# Patient Record
Sex: MALE | Race: WHITE
[De-identification: names, ages, dates, MRNs, and addresses within clinical notes are randomized per-mention and may not be internally consistent; named-entity substitution may affect disease eponyms.]

---

## 2017-09-19 ENCOUNTER — HOSPITAL ENCOUNTER (OUTPATIENT)
Dept: HOSPITAL 92 - ERS | Age: 42
Setting detail: OBSERVATION
LOS: 1 days | Discharge: HOME | End: 2017-09-20
Attending: HOSPITALIST | Admitting: HOSPITALIST
Payer: SELF-PAY

## 2017-09-19 VITALS — BODY MASS INDEX: 34 KG/M2

## 2017-09-19 DIAGNOSIS — E78.5: ICD-10-CM

## 2017-09-19 DIAGNOSIS — Z79.82: ICD-10-CM

## 2017-09-19 DIAGNOSIS — Z79.4: ICD-10-CM

## 2017-09-19 DIAGNOSIS — I12.9: ICD-10-CM

## 2017-09-19 DIAGNOSIS — Z87.891: ICD-10-CM

## 2017-09-19 DIAGNOSIS — E11.22: ICD-10-CM

## 2017-09-19 DIAGNOSIS — Z79.899: ICD-10-CM

## 2017-09-19 DIAGNOSIS — R07.89: Primary | ICD-10-CM

## 2017-09-19 DIAGNOSIS — N18.4: ICD-10-CM

## 2017-09-19 LAB
ALP SERPL-CCNC: 62 U/L (ref 40–150)
ALT SERPL W P-5'-P-CCNC: 16 U/L (ref 8–55)
ANION GAP SERPL CALC-SCNC: 13 MMOL/L (ref 10–20)
AST SERPL-CCNC: 14 U/L (ref 5–34)
BASOPHILS # BLD AUTO: 0.1 THOU/UL (ref 0–0.2)
BASOPHILS NFR BLD AUTO: 0.9 % (ref 0–1)
BILIRUB SERPL-MCNC: 0.3 MG/DL (ref 0.2–1.2)
BUN SERPL-MCNC: 32 MG/DL (ref 8.9–20.6)
CALCIUM SERPL-MCNC: 9 MG/DL (ref 7.8–10.44)
CHLORIDE SERPL-SCNC: 106 MMOL/L (ref 98–107)
CK SERPL-CCNC: 199 U/L (ref 30–200)
CO2 SERPL-SCNC: 22 MMOL/L (ref 22–29)
CREAT CL PREDICTED SERPL C-G-VRATE: 0 ML/MIN (ref 70–130)
EOSINOPHIL # BLD AUTO: 0.3 THOU/UL (ref 0–0.7)
EOSINOPHIL NFR BLD AUTO: 3.1 % (ref 0–10)
GLOBULIN SER CALC-MCNC: 2.9 G/DL (ref 2.4–3.5)
HCT VFR BLD CALC: 39.6 % (ref 42–52)
HYALINE CASTS #/AREA URNS LPF: (no result) LPF
LIPASE SERPL-CCNC: 25 U/L (ref 8–78)
LYMPHOCYTES # BLD: 2.5 THOU/UL (ref 1.2–3.4)
LYMPHOCYTES NFR BLD AUTO: 23.8 % (ref 21–51)
MONOCYTES # BLD AUTO: 1 THOU/UL (ref 0.11–0.59)
MONOCYTES NFR BLD AUTO: 9.5 % (ref 0–10)
NEUTROPHILS # BLD AUTO: 6.5 THOU/UL (ref 1.4–6.5)
PROT UR STRIP.AUTO-MCNC: (no result) MG/DL
RBC # BLD AUTO: 4.3 MILL/UL (ref 4.7–6.1)
RBC UR QL AUTO: (no result) HPF (ref 0–3)
TROPONIN I SERPL DL<=0.01 NG/ML-MCNC: (no result) NG/ML (ref ?–0.03)
TROPONIN I SERPL DL<=0.01 NG/ML-MCNC: 0.01 NG/ML (ref ?–0.03)
WBC # BLD AUTO: 10.4 THOU/UL (ref 4.8–10.8)
WBC UR QL AUTO: (no result) HPF (ref 0–3)

## 2017-09-19 PROCEDURE — 71010: CPT

## 2017-09-19 PROCEDURE — 81015 MICROSCOPIC EXAM OF URINE: CPT

## 2017-09-19 PROCEDURE — 85025 COMPLETE CBC W/AUTO DIFF WBC: CPT

## 2017-09-19 PROCEDURE — G0378 HOSPITAL OBSERVATION PER HR: HCPCS

## 2017-09-19 PROCEDURE — A4216 STERILE WATER/SALINE, 10 ML: HCPCS

## 2017-09-19 PROCEDURE — 83690 ASSAY OF LIPASE: CPT

## 2017-09-19 PROCEDURE — 93005 ELECTROCARDIOGRAM TRACING: CPT

## 2017-09-19 PROCEDURE — 81003 URINALYSIS AUTO W/O SCOPE: CPT

## 2017-09-19 PROCEDURE — 80053 COMPREHEN METABOLIC PANEL: CPT

## 2017-09-19 PROCEDURE — 80048 BASIC METABOLIC PNL TOTAL CA: CPT

## 2017-09-19 PROCEDURE — 82553 CREATINE MB FRACTION: CPT

## 2017-09-19 PROCEDURE — 84484 ASSAY OF TROPONIN QUANT: CPT

## 2017-09-19 PROCEDURE — 94760 N-INVAS EAR/PLS OXIMETRY 1: CPT

## 2017-09-19 PROCEDURE — 36415 COLL VENOUS BLD VENIPUNCTURE: CPT

## 2017-09-19 NOTE — RAD
SINGLE VIEW OF THE CHEST:

9/19/17

 

COMPARISON:  

6/21/17

 

HISTORY: 

Chest pain. 

 

FINDINGS:

Single view of the chest shows a normal sized cardiomediastinal silhouette. There is no evidence of 
consolidation, mass, or pleural effusion. The bones are unremarkable.

 

IMPRESSION:

No evidence of acute cardiopulmonary disease.

 

POS: [facility]

## 2017-09-19 NOTE — PDOC.EVN
Event Note





- Event Note


Event Note: 





[id number]


H&P Dictated


1. Chest pain


2. H/O HTN


3. H/O HPL


4. H/O DM type 2


plan:


see orders

## 2017-09-20 VITALS — DIASTOLIC BLOOD PRESSURE: 92 MMHG | SYSTOLIC BLOOD PRESSURE: 164 MMHG | TEMPERATURE: 97.4 F

## 2017-09-20 LAB
ANION GAP SERPL CALC-SCNC: 14 MMOL/L (ref 10–20)
BASOPHILS # BLD AUTO: 0.1 THOU/UL (ref 0–0.2)
BASOPHILS NFR BLD AUTO: 1 % (ref 0–1)
BUN SERPL-MCNC: 34 MG/DL (ref 8.9–20.6)
CALCIUM SERPL-MCNC: 9.4 MG/DL (ref 7.8–10.44)
CHLORIDE SERPL-SCNC: 103 MMOL/L (ref 98–107)
CO2 SERPL-SCNC: 25 MMOL/L (ref 22–29)
CREAT CL PREDICTED SERPL C-G-VRATE: 82 ML/MIN (ref 70–130)
EOSINOPHIL # BLD AUTO: 0.4 THOU/UL (ref 0–0.7)
EOSINOPHIL NFR BLD AUTO: 4.5 % (ref 0–10)
HCT VFR BLD CALC: 35.4 % (ref 42–52)
LYMPHOCYTES # BLD: 2.5 THOU/UL (ref 1.2–3.4)
LYMPHOCYTES NFR BLD AUTO: 28.8 % (ref 21–51)
MONOCYTES # BLD AUTO: 0.8 THOU/UL (ref 0.11–0.59)
MONOCYTES NFR BLD AUTO: 9.4 % (ref 0–10)
NEUTROPHILS # BLD AUTO: 4.8 THOU/UL (ref 1.4–6.5)
RBC # BLD AUTO: 3.84 MILL/UL (ref 4.7–6.1)
TROPONIN I SERPL DL<=0.01 NG/ML-MCNC: (no result) NG/ML (ref ?–0.03)
TROPONIN I SERPL DL<=0.01 NG/ML-MCNC: (no result) NG/ML (ref ?–0.03)
WBC # BLD AUTO: 8.6 THOU/UL (ref 4.8–10.8)

## 2017-09-20 NOTE — PDOC.PN
- Subjective


Encounter Start Date: 09/20/17


Encounter Start Time: 07:39


Subjective: Chest pain resolved


-: Pt requesting discharge home today





- Objective


MAR Reviewed: Yes


Vital Signs & Weight: 


 Vital Signs (12 hours)











  Temp Pulse Resp BP Pulse Ox


 


 09/20/17 04:45  97.6 F  73  18  142/86 H  98


 


 09/19/17 22:47  97.9 F  72  16  142/93 H  96








 Weight











Weight                         250 lb 8 oz














I&O: 


 











 09/19/17 09/20/17 09/21/17





 06:59 06:59 06:59


 


Intake Total  480 


 


Balance  480 











Result Diagrams: 


 09/20/17 03:50





 09/20/17 03:50





Phys Exam





- Physical Examination


Constitutional: NAD


HEENT: moist MMs, sclera anicteric


Neck: no nodes, no JVD


Respiratory: no wheezing, no rales, no rhonchi, clear to auscultation bilateral


Cardiovascular: RRR, no significant murmur


Gastrointestinal: soft, non-tender, positive bowel sounds


Musculoskeletal: no edema


Neurological: non-focal, moves all 4 limbs





Dx/Plan


(1) Type 2 diabetes mellitus


Status: Acute   





(2) Chest pain


Code(s): R07.9 - CHEST PAIN, UNSPECIFIED   Status: Acute   





(3) Dyslipidemia


Code(s): E78.5 - HYPERLIPIDEMIA, UNSPECIFIED   Status: Acute   





(4) Hypertensive urgency


Code(s): I16.0 - HYPERTENSIVE URGENCY   Status: Acute   





- Plan





* Chest pain: serial troponins negative; stress test from 6-22-17 negative


* CKD (Cr baseline ~ 1.6-1.9): avoid nephrotoxic agents


* DM2: f/u accu checks


* HTN: increase clonidine to 0.2mg BID





Discharge home today.





Discharge Summary: [id number]

## 2017-09-20 NOTE — DIS
DATE OF ADMISSION:  09/19/2017

 

DATE OF DISCHARGE:  09/20/2017

 

PRIMARY DISCHARGE DIAGNOSIS:  Chest pain likely secondary to uncontrolled hypertension.

 

SECONDARY DISCHARGE DIAGNOSES:

1.  Chronic kidney disease with a creatinine baseline of 1.6-1.9.

2.  Diabetes mellitus type 2.

 

HOSPITAL COURSE SUMMARY:  This is a pleasant 42-year-old male who presented with chest pain, likely 
secondary to uncontrolled hypertension at home.  I will increase his clonidine to 0.2 mg b.i.d. upon
 discharge for better blood pressure control.  His serial cardiac enzymes were negative, thus ruling
 out acute coronary syndrome.  He had a stress test performed on 06/22/2017 which per the patient wa
s negative and therefore there is no need to repeat this today.  Regardless, the patient does not wa
nt to have a stress test done today and is asking to be discharged home.

 

DISCHARGE PHYSICAL EXAMINATION, LABS AND IMAGING:  Please refer to my progress note from earlier tod
ay.

 

DISCHARGE MEDICATIONS:  Reviewed and reconciled.  Please refer to EMR for details.

 

DISCHARGE PLAN/DISPOSITION:

1.  Discharge home today.

2.  Follow up with PCP in 1 week.

3.  Clonidine has been increased to 0.2 mg b.i.d. as uncontrolled hypertension most likely responsib
le for his chest pain.

 

DIET:  Diabetic diet.

## 2020-04-26 ENCOUNTER — HOSPITAL ENCOUNTER (INPATIENT)
Dept: HOSPITAL 92 - ERS | Age: 45
LOS: 4 days | Discharge: HOME | DRG: 957 | End: 2020-04-30
Attending: SURGERY | Admitting: SURGERY
Payer: SELF-PAY

## 2020-04-26 VITALS — BODY MASS INDEX: 31.8 KG/M2

## 2020-04-26 DIAGNOSIS — E11.65: ICD-10-CM

## 2020-04-26 DIAGNOSIS — N25.81: ICD-10-CM

## 2020-04-26 DIAGNOSIS — S22.31XA: ICD-10-CM
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
## 2020-04-26 DIAGNOSIS — S93.401A: ICD-10-CM
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
## 2020-04-26 DIAGNOSIS — S00.83XA: ICD-10-CM

## 2020-04-26 DIAGNOSIS — Z79.4: ICD-10-CM

## 2020-04-26 DIAGNOSIS — S30.1XXA: ICD-10-CM

## 2020-04-26 DIAGNOSIS — S22.42XA: ICD-10-CM

## 2020-04-26 DIAGNOSIS — E78.5: ICD-10-CM

## 2020-04-26 DIAGNOSIS — Z79.899: ICD-10-CM

## 2020-04-26 DIAGNOSIS — S36.892A: ICD-10-CM

## 2020-04-26 DIAGNOSIS — I12.0: ICD-10-CM

## 2020-04-26 DIAGNOSIS — S02.2XXA: ICD-10-CM

## 2020-04-26 DIAGNOSIS — E87.5: ICD-10-CM

## 2020-04-26 DIAGNOSIS — E83.39: ICD-10-CM

## 2020-04-26 DIAGNOSIS — F41.9: ICD-10-CM

## 2020-04-26 DIAGNOSIS — S12.601A: ICD-10-CM

## 2020-04-26 DIAGNOSIS — N18.6: ICD-10-CM

## 2020-04-26 DIAGNOSIS — Z79.82: ICD-10-CM

## 2020-04-26 DIAGNOSIS — S63.094A: Primary | ICD-10-CM

## 2020-04-26 DIAGNOSIS — S63.391A: ICD-10-CM

## 2020-04-26 DIAGNOSIS — E11.22: ICD-10-CM

## 2020-04-26 DIAGNOSIS — S12.201A: ICD-10-CM

## 2020-04-26 DIAGNOSIS — N25.0: ICD-10-CM

## 2020-04-26 DIAGNOSIS — S64.11XA: ICD-10-CM

## 2020-04-26 DIAGNOSIS — Z87.891: ICD-10-CM

## 2020-04-26 DIAGNOSIS — D63.1: ICD-10-CM

## 2020-04-26 LAB
ALBUMIN SERPL BCG-MCNC: 3.3 G/DL (ref 3.5–5)
ALP SERPL-CCNC: 93 U/L (ref 40–110)
ALT SERPL W P-5'-P-CCNC: 73 U/L (ref 8–55)
ANION GAP SERPL CALC-SCNC: 15 MMOL/L (ref 10–20)
ANION GAP SERPL CALC-SCNC: 15 MMOL/L (ref 10–20)
APTT PPP: 24.8 SEC (ref 22.9–36.1)
AST SERPL-CCNC: 68 U/L (ref 5–34)
BASOPHILS # BLD AUTO: 0.1 THOU/UL (ref 0–0.2)
BASOPHILS NFR BLD AUTO: 0.7 % (ref 0–1)
BILIRUB SERPL-MCNC: 0.3 MG/DL (ref 0.2–1.2)
BUN SERPL-MCNC: 69 MG/DL (ref 8.9–20.6)
BUN SERPL-MCNC: 70 MG/DL (ref 8.9–20.6)
CALCIUM SERPL-MCNC: 8.3 MG/DL (ref 7.8–10.44)
CALCIUM SERPL-MCNC: 9 MG/DL (ref 7.8–10.44)
CHLORIDE SERPL-SCNC: 107 MMOL/L (ref 98–107)
CHLORIDE SERPL-SCNC: 108 MMOL/L (ref 98–107)
CO2 SERPL-SCNC: 15 MMOL/L (ref 22–29)
CO2 SERPL-SCNC: 18 MMOL/L (ref 22–29)
CREAT CL PREDICTED SERPL C-G-VRATE: 0 ML/MIN (ref 70–130)
CREAT CL PREDICTED SERPL C-G-VRATE: 0 ML/MIN (ref 70–130)
EOSINOPHIL # BLD AUTO: 0.4 THOU/UL (ref 0–0.7)
EOSINOPHIL NFR BLD AUTO: 3.1 % (ref 0–10)
GLOBULIN SER CALC-MCNC: 2.9 G/DL (ref 2.4–3.5)
GLUCOSE SERPL-MCNC: 255 MG/DL (ref 70–105)
GLUCOSE SERPL-MCNC: 265 MG/DL (ref 70–105)
HGB BLD-MCNC: 10.2 G/DL (ref 14–18)
INR PPP: 0.9
LYMPHOCYTES # BLD: 1.3 THOU/UL (ref 1.2–3.4)
LYMPHOCYTES NFR BLD AUTO: 9.3 % (ref 21–51)
MAGNESIUM SERPL-MCNC: 1.7 MG/DL (ref 1.6–2.6)
MCH RBC QN AUTO: 28.5 PG (ref 27–31)
MCV RBC AUTO: 87.4 FL (ref 78–98)
MONOCYTES # BLD AUTO: 1.1 THOU/UL (ref 0.11–0.59)
MONOCYTES NFR BLD AUTO: 8.2 % (ref 0–10)
NEUTROPHILS # BLD AUTO: 10.9 THOU/UL (ref 1.4–6.5)
NEUTROPHILS NFR BLD AUTO: 78.8 % (ref 42–75)
PLATELET # BLD AUTO: 269 THOU/UL (ref 130–400)
POTASSIUM SERPL-SCNC: 4.7 MMOL/L (ref 3.5–5.1)
POTASSIUM SERPL-SCNC: 6.1 MMOL/L (ref 3.5–5.1)
PROTHROMBIN TIME: 12.2 SEC (ref 12–14.7)
RBC # BLD AUTO: 3.58 MILL/UL (ref 4.7–6.1)
SODIUM SERPL-SCNC: 132 MMOL/L (ref 136–145)
SODIUM SERPL-SCNC: 135 MMOL/L (ref 136–145)
WBC # BLD AUTO: 13.8 THOU/UL (ref 4.8–10.8)

## 2020-04-26 PROCEDURE — 85730 THROMBOPLASTIN TIME PARTIAL: CPT

## 2020-04-26 PROCEDURE — S0020 INJECTION, BUPIVICAINE HYDRO: HCPCS

## 2020-04-26 PROCEDURE — 83735 ASSAY OF MAGNESIUM: CPT

## 2020-04-26 PROCEDURE — 0RSNXZZ REPOSITION RIGHT WRIST JOINT, EXTERNAL APPROACH: ICD-10-PCS | Performed by: SURGERY

## 2020-04-26 PROCEDURE — 96367 TX/PROPH/DG ADDL SEQ IV INF: CPT

## 2020-04-26 PROCEDURE — 71045 X-RAY EXAM CHEST 1 VIEW: CPT

## 2020-04-26 PROCEDURE — 86480 TB TEST CELL IMMUN MEASURE: CPT

## 2020-04-26 PROCEDURE — 80048 BASIC METABOLIC PNL TOTAL CA: CPT

## 2020-04-26 PROCEDURE — 96376 TX/PRO/DX INJ SAME DRUG ADON: CPT

## 2020-04-26 PROCEDURE — 86704 HEP B CORE ANTIBODY TOTAL: CPT

## 2020-04-26 PROCEDURE — 96365 THER/PROPH/DIAG IV INF INIT: CPT

## 2020-04-26 PROCEDURE — 85610 PROTHROMBIN TIME: CPT

## 2020-04-26 PROCEDURE — 81015 MICROSCOPIC EXAM OF URINE: CPT

## 2020-04-26 PROCEDURE — 36415 COLL VENOUS BLD VENIPUNCTURE: CPT

## 2020-04-26 PROCEDURE — 81003 URINALYSIS AUTO W/O SCOPE: CPT

## 2020-04-26 PROCEDURE — G0390 TRAUMA RESPONS W/HOSP CRITI: HCPCS

## 2020-04-26 PROCEDURE — 84100 ASSAY OF PHOSPHORUS: CPT

## 2020-04-26 PROCEDURE — 70486 CT MAXILLOFACIAL W/O DYE: CPT

## 2020-04-26 PROCEDURE — 93005 ELECTROCARDIOGRAM TRACING: CPT

## 2020-04-26 PROCEDURE — 76000 FLUOROSCOPY <1 HR PHYS/QHP: CPT

## 2020-04-26 PROCEDURE — 83036 HEMOGLOBIN GLYCOSYLATED A1C: CPT

## 2020-04-26 PROCEDURE — 71250 CT THORAX DX C-: CPT

## 2020-04-26 PROCEDURE — 80053 COMPREHEN METABOLIC PANEL: CPT

## 2020-04-26 PROCEDURE — 90715 TDAP VACCINE 7 YRS/> IM: CPT

## 2020-04-26 PROCEDURE — 74177 CT ABD & PELVIS W/CONTRAST: CPT

## 2020-04-26 PROCEDURE — 70450 CT HEAD/BRAIN W/O DYE: CPT

## 2020-04-26 PROCEDURE — S0028 INJECTION, FAMOTIDINE, 20 MG: HCPCS

## 2020-04-26 PROCEDURE — 36416 COLLJ CAPILLARY BLOOD SPEC: CPT

## 2020-04-26 PROCEDURE — 83970 ASSAY OF PARATHORMONE: CPT

## 2020-04-26 PROCEDURE — 72125 CT NECK SPINE W/O DYE: CPT

## 2020-04-26 PROCEDURE — 86803 HEPATITIS C AB TEST: CPT

## 2020-04-26 PROCEDURE — G0365 VESSEL MAPPING HEMO ACCESS: HCPCS

## 2020-04-26 PROCEDURE — 87340 HEPATITIS B SURFACE AG IA: CPT

## 2020-04-26 PROCEDURE — 86901 BLOOD TYPING SEROLOGIC RH(D): CPT

## 2020-04-26 PROCEDURE — C1713 ANCHOR/SCREW BN/BN,TIS/BN: HCPCS

## 2020-04-26 PROCEDURE — 93970 EXTREMITY STUDY: CPT

## 2020-04-26 PROCEDURE — 96375 TX/PRO/DX INJ NEW DRUG ADDON: CPT

## 2020-04-26 PROCEDURE — 86850 RBC ANTIBODY SCREEN: CPT

## 2020-04-26 PROCEDURE — 86706 HEP B SURFACE ANTIBODY: CPT

## 2020-04-26 PROCEDURE — 85025 COMPLETE CBC W/AUTO DIFF WBC: CPT

## 2020-04-26 PROCEDURE — 90471 IMMUNIZATION ADMIN: CPT

## 2020-04-26 PROCEDURE — 86900 BLOOD TYPING SEROLOGIC ABO: CPT

## 2020-04-26 RX ADMIN — DOCUSATE SODIUM 50 MG AND SENNOSIDES 8.6 MG SCH TAB: 8.6; 5 TABLET, FILM COATED ORAL at 20:22

## 2020-04-26 RX ADMIN — BACITRACIN SCH PK: 500 OINTMENT TOPICAL at 20:21
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
## 2020-04-26 NOTE — RAD
Exam:Intraoperative fluoroscopy



HISTORY: Wrist fracture. Closed reduction.



COMPARISON: 4/26/2020

Exposure: 17 seconds, 0.21 mg



FINDINGS: 5 fluoroscopic images demonstrate interval relocation of previously noted lunate dislocatio
n. Near anatomic alignment. Overlying fiberglass cast is noted.



IMPRESSION: Fluoroscopy as above.



Reported By: Sonya Lopes 

Electronically Signed:  4/26/2020 4:07 PM
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
## 2020-04-26 NOTE — RAD
RIGHT WRIST 3 VIEWS:

 

HISTORY: 

Wrist injury.

 

FINDINGS: 

There is disruption of the proximal carpal row.  The lunate is displaced volarly and rotated consiste
nt with a lunate dislocation.  Vascular calcifications are incidentally noted.

 

IMPRESSION: 

Lunate dislocation.

 

POS: [facility]
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
## 2020-04-27 LAB
ANION GAP SERPL CALC-SCNC: 14 MMOL/L (ref 10–20)
BUN SERPL-MCNC: 66 MG/DL (ref 8.9–20.6)
CALCIUM SERPL-MCNC: 8.1 MG/DL (ref 7.8–10.44)
CHLORIDE SERPL-SCNC: 104 MMOL/L (ref 98–107)
CO2 SERPL-SCNC: 23 MMOL/L (ref 22–29)
CREAT CL PREDICTED SERPL C-G-VRATE: 33 ML/MIN (ref 70–130)
GLUCOSE SERPL-MCNC: 243 MG/DL (ref 70–105)
HGB BLD-MCNC: 9 G/DL (ref 14–18)
MAGNESIUM SERPL-MCNC: 1.6 MG/DL (ref 1.6–2.6)
MCH RBC QN AUTO: 27.9 PG (ref 27–31)
MCV RBC AUTO: 84.9 FL (ref 78–98)
MDIFF COMPLETE?: YES
PLATELET # BLD AUTO: 202 THOU/UL (ref 130–400)
POTASSIUM SERPL-SCNC: 4.2 MMOL/L (ref 3.5–5.1)
RBC # BLD AUTO: 3.22 MILL/UL (ref 4.7–6.1)
SODIUM SERPL-SCNC: 137 MMOL/L (ref 136–145)
WBC # BLD AUTO: 9.7 THOU/UL (ref 4.8–10.8)

## 2020-04-27 RX ADMIN — DOCUSATE SODIUM 50 MG AND SENNOSIDES 8.6 MG SCH TAB: 8.6; 5 TABLET, FILM COATED ORAL at 08:16

## 2020-04-27 RX ADMIN — BACITRACIN SCH PK: 500 OINTMENT TOPICAL at 08:16

## 2020-04-27 RX ADMIN — INSULIN HUMAN PRN UNIT: 100 INJECTION, SOLUTION PARENTERAL at 21:32

## 2020-04-27 RX ADMIN — INSULIN HUMAN PRN UNIT: 100 INJECTION, SOLUTION PARENTERAL at 11:46

## 2020-04-27 RX ADMIN — BACITRACIN SCH: 500 OINTMENT TOPICAL at 22:54

## 2020-04-27 RX ADMIN — DOCUSATE SODIUM 50 MG AND SENNOSIDES 8.6 MG SCH TAB: 8.6; 5 TABLET, FILM COATED ORAL at 21:34

## 2020-04-27 RX ADMIN — HEPARIN SODIUM SCH UNITS: 5000 INJECTION, SOLUTION INTRAVENOUS; SUBCUTANEOUS at 21:35

## 2020-04-27 RX ADMIN — HEPARIN SODIUM SCH UNITS: 5000 INJECTION, SOLUTION INTRAVENOUS; SUBCUTANEOUS at 10:36

## 2020-04-27 RX ADMIN — INSULIN HUMAN PRN UNIT: 100 INJECTION, SOLUTION PARENTERAL at 05:04

## 2020-04-27 RX ADMIN — INSULIN HUMAN PRN UNIT: 100 INJECTION, SOLUTION PARENTERAL at 17:42

## 2020-04-27 RX ADMIN — BUPROPION HYDROCHLORIDE SCH MG: 150 TABLET, FILM COATED, EXTENDED RELEASE ORAL at 11:45

## 2020-04-27 RX ADMIN — BUPROPION HYDROCHLORIDE SCH MG: 150 TABLET, FILM COATED, EXTENDED RELEASE ORAL at 17:42

## 2020-04-27 RX ADMIN — HEPARIN SODIUM SCH UNITS: 5000 INJECTION, SOLUTION INTRAVENOUS; SUBCUTANEOUS at 15:16

## 2020-04-27 RX ADMIN — INSULIN HUMAN PRN UNIT: 100 INJECTION, SOLUTION PARENTERAL at 00:46

## 2020-04-27 RX ADMIN — BUPROPION HYDROCHLORIDE SCH MG: 150 TABLET, FILM COATED, EXTENDED RELEASE ORAL at 06:07
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
## 2020-04-27 NOTE — ULT
VENOUS MAPPING:

 

DATE: 4/27/2020.

 

PROVIDED CLINICAL HISTORY: 

Dialysis access.

 

FINDINGS: 

Evaluation of the right upper extremity is limited due to the arm being wrapped.  

 

RIGHT:Brachial artery 4.8 mm.

Radial artery not visualized.

Ulnar artery not visualized.

 

Cephalic:

Proximal humerus     6.5 mm

Mid humerus          5.7 mm

Distal               not visualized

Elbow                not visualized

Proximal forearm     not visualized

Mid                  not visualized

Distal               not visualized

 

Basilic:

Proximal humerus     4.1 mm

Mid humerus          3.6 mm

Distal               not visualized

Elbow                not visualized

Proximal forearm     not visualized

Mid                  not visualized

Distal               not visualized

 

LEFT:

Brachial artery 4.9 mm.

Radial artery 2.1 mm.

Ulnar artery 2.8 mm.

 

Cephalic:

Proximal humerus     5.0 mm

Mid humerus          4.8 mm

Distal               4.8 mm

Elbow                6.9 mm

Proximal forearm     3.3 mm

Mid                  4.8 mm

Distal               3.5 mm 

 

Basilic:

Proximal humerus     5.0 mm

Mid humerus          2.9 mm

Distal               3.0 mm

Elbow                3.8 mm

Proximal forearm     1.9 mm

Mid                  1.5 mm

Distal               1.9 mm

 

IMPRESSION: 

Venous mapping as above.

 

POS: LARA
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
## 2020-04-28 LAB
ANION GAP SERPL CALC-SCNC: 15 MMOL/L (ref 10–20)
BASOPHILS # BLD AUTO: 0 THOU/UL (ref 0–0.2)
BASOPHILS NFR BLD AUTO: 0.5 % (ref 0–1)
BUN SERPL-MCNC: 69 MG/DL (ref 8.9–20.6)
CALCIUM SERPL-MCNC: 8.4 MG/DL (ref 7.8–10.44)
CHLORIDE SERPL-SCNC: 98 MMOL/L (ref 98–107)
CO2 SERPL-SCNC: 28 MMOL/L (ref 22–29)
CREAT CL PREDICTED SERPL C-G-VRATE: 31 ML/MIN (ref 70–130)
EOSINOPHIL # BLD AUTO: 0.2 THOU/UL (ref 0–0.7)
EOSINOPHIL NFR BLD AUTO: 1.5 % (ref 0–10)
GLUCOSE SERPL-MCNC: 227 MG/DL (ref 70–105)
HGB BLD-MCNC: 8.6 G/DL (ref 14–18)
LYMPHOCYTES # BLD: 1 THOU/UL (ref 1.2–3.4)
LYMPHOCYTES NFR BLD AUTO: 9.6 % (ref 21–51)
MAGNESIUM SERPL-MCNC: 1.7 MG/DL (ref 1.6–2.6)
MCH RBC QN AUTO: 29 PG (ref 27–31)
MCV RBC AUTO: 86.2 FL (ref 78–98)
MONOCYTES # BLD AUTO: 1.2 THOU/UL (ref 0.11–0.59)
MONOCYTES NFR BLD AUTO: 11.5 % (ref 0–10)
NEUTROPHILS # BLD AUTO: 7.8 THOU/UL (ref 1.4–6.5)
NEUTROPHILS NFR BLD AUTO: 76.9 % (ref 42–75)
PLATELET # BLD AUTO: 193 THOU/UL (ref 130–400)
POTASSIUM SERPL-SCNC: 4 MMOL/L (ref 3.5–5.1)
RBC # BLD AUTO: 2.97 MILL/UL (ref 4.7–6.1)
SODIUM SERPL-SCNC: 137 MMOL/L (ref 136–145)
WBC # BLD AUTO: 10.2 THOU/UL (ref 4.8–10.8)

## 2020-04-28 PROCEDURE — 01Q50ZZ REPAIR MEDIAN NERVE, OPEN APPROACH: ICD-10-PCS | Performed by: ORTHOPAEDIC SURGERY

## 2020-04-28 PROCEDURE — 0MQ70ZZ REPAIR RIGHT HAND BURSA AND LIGAMENT, OPEN APPROACH: ICD-10-PCS | Performed by: ORTHOPAEDIC SURGERY

## 2020-04-28 PROCEDURE — 0PSM04Z REPOSITION RIGHT CARPAL WITH INTERNAL FIXATION DEVICE, OPEN APPROACH: ICD-10-PCS | Performed by: ORTHOPAEDIC SURGERY

## 2020-04-28 RX ADMIN — DOCUSATE SODIUM 50 MG AND SENNOSIDES 8.6 MG SCH: 8.6; 5 TABLET, FILM COATED ORAL at 10:37

## 2020-04-28 RX ADMIN — BUPROPION HYDROCHLORIDE SCH: 150 TABLET, FILM COATED, EXTENDED RELEASE ORAL at 14:21

## 2020-04-28 RX ADMIN — BACITRACIN SCH: 500 OINTMENT TOPICAL at 10:36

## 2020-04-28 RX ADMIN — INSULIN HUMAN PRN UNIT: 100 INJECTION, SOLUTION PARENTERAL at 23:49

## 2020-04-28 RX ADMIN — DOCUSATE SODIUM 50 MG AND SENNOSIDES 8.6 MG SCH TAB: 8.6; 5 TABLET, FILM COATED ORAL at 20:06

## 2020-04-28 RX ADMIN — BUPROPION HYDROCHLORIDE SCH MG: 150 TABLET, FILM COATED, EXTENDED RELEASE ORAL at 17:02

## 2020-04-28 RX ADMIN — INSULIN HUMAN PRN UNITS: 100 INJECTION, SOLUTION PARENTERAL at 17:19

## 2020-04-28 RX ADMIN — INSULIN HUMAN PRN UNITS: 100 INJECTION, SOLUTION PARENTERAL at 06:19

## 2020-04-28 RX ADMIN — HEPARIN SODIUM SCH UNITS: 5000 INJECTION, SOLUTION INTRAVENOUS; SUBCUTANEOUS at 20:06

## 2020-04-28 RX ADMIN — HYDROCODONE BITARTRATE AND ACETAMINOPHEN PRN TAB: 7.5; 325 TABLET ORAL at 23:38

## 2020-04-28 RX ADMIN — HEPARIN SODIUM SCH: 5000 INJECTION, SOLUTION INTRAVENOUS; SUBCUTANEOUS at 15:10

## 2020-04-28 RX ADMIN — BACITRACIN SCH PK: 500 OINTMENT TOPICAL at 20:06

## 2020-04-28 RX ADMIN — BUPROPION HYDROCHLORIDE SCH: 150 TABLET, FILM COATED, EXTENDED RELEASE ORAL at 10:35

## 2020-04-28 RX ADMIN — HEPARIN SODIUM SCH: 5000 INJECTION, SOLUTION INTRAVENOUS; SUBCUTANEOUS at 10:36

## 2020-04-28 RX ADMIN — INSULIN HUMAN PRN UNITS: 100 INJECTION, SOLUTION PARENTERAL at 20:25

## 2020-04-28 NOTE — RAD
PORTABLE CHEST:

 

DATE: 4/28/2020.

 

PROVIDED CLINICAL HISTORY: 

Pleural effusion.

 

FINDINGS: 

Comparison 4/27/2020.  Significant interval change with respect to the prior examination is not appar
ent.  

 

IMPRESSION: 

As above.

 

POS: LARA
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
## 2020-04-28 NOTE — RAD
Radiograph right wrist 3 views:



DATE:

4/28/2020

9:03 AM



HISTORY:

44-year-old male with acute, traumatic right lunate dislocation.



COMPARISON:

4/26/2020



FINDINGS:

A total of 4 small field-of-view fluoroscopic spot images obtained with C-arm in the OR.

The previously demonstrated lunate dislocation has been reduced. There is still widening of the scaph
olunate interval. Images of the previously demonstrated tissue retractors, multiple K wire

placement through carpal bones.



IMPRESSION:

After reduction of the lunate dislocation, multiple pin fixation of wrist.



Reported By: Ace Dickey 

Electronically Signed:  4/28/2020 12:06 PM

## 2020-04-29 LAB
ANION GAP SERPL CALC-SCNC: 18 MMOL/L (ref 10–20)
BACTERIA UR QL AUTO: (no result) HPF
BASOPHILS # BLD AUTO: 0.2 THOU/UL (ref 0–0.2)
BASOPHILS NFR BLD AUTO: 1.3 % (ref 0–1)
BUN SERPL-MCNC: 73 MG/DL (ref 8.9–20.6)
CALCIUM SERPL-MCNC: 8 MG/DL (ref 7.8–10.44)
CHLORIDE SERPL-SCNC: 101 MMOL/L (ref 98–107)
CO2 SERPL-SCNC: 22 MMOL/L (ref 22–29)
CREAT CL PREDICTED SERPL C-G-VRATE: 28 ML/MIN (ref 70–130)
EOSINOPHIL # BLD AUTO: 0 THOU/UL (ref 0–0.7)
EOSINOPHIL NFR BLD AUTO: 0.3 % (ref 0–10)
GLUCOSE SERPL-MCNC: 240 MG/DL (ref 70–105)
GLUCOSE UR STRIP-MCNC: 500 MG/DL
HBSAG INDEX: 0.12 S/CO (ref 0–0.99)
HBV SURFACE AB SERPL IA-ACNC: 2.75 MIU/ML
HEP B CORE TOTAL INDEX: 0.05 S/CO (ref 0–0.79)
HEP C INDEX: 0.15 S/CO (ref 0–0.79)
HGB BLD-MCNC: 8.2 G/DL (ref 14–18)
LYMPHOCYTES # BLD: 0.9 THOU/UL (ref 1.2–3.4)
LYMPHOCYTES NFR BLD AUTO: 6.5 % (ref 21–51)
MAGNESIUM SERPL-MCNC: 1.8 MG/DL (ref 1.6–2.6)
MCH RBC QN AUTO: 28.5 PG (ref 27–31)
MCV RBC AUTO: 87 FL (ref 78–98)
MONOCYTES # BLD AUTO: 1.4 THOU/UL (ref 0.11–0.59)
MONOCYTES NFR BLD AUTO: 11 % (ref 0–10)
NEUTROPHILS # BLD AUTO: 10.6 THOU/UL (ref 1.4–6.5)
NEUTROPHILS NFR BLD AUTO: 81 % (ref 42–75)
PLATELET # BLD AUTO: 207 THOU/UL (ref 130–400)
POTASSIUM SERPL-SCNC: 4.3 MMOL/L (ref 3.5–5.1)
PROT UR STRIP.AUTO-MCNC: 300 MG/DL
RBC # BLD AUTO: 2.88 MILL/UL (ref 4.7–6.1)
RBC UR QL AUTO: (no result) HPF (ref 0–3)
SODIUM SERPL-SCNC: 137 MMOL/L (ref 136–145)
WBC # BLD AUTO: 13.1 THOU/UL (ref 4.8–10.8)
WBC UR QL AUTO: (no result) HPF (ref 0–3)

## 2020-04-29 PROCEDURE — 031C0ZF BYPASS LEFT RADIAL ARTERY TO LOWER ARM VEIN, OPEN APPROACH: ICD-10-PCS | Performed by: SURGERY

## 2020-04-29 PROCEDURE — 0DQU4ZZ REPAIR OMENTUM, PERCUTANEOUS ENDOSCOPIC APPROACH: ICD-10-PCS | Performed by: SURGERY

## 2020-04-29 PROCEDURE — 06HY33Z INSERTION OF INFUSION DEVICE INTO LOWER VEIN, PERCUTANEOUS APPROACH: ICD-10-PCS | Performed by: SURGERY

## 2020-04-29 PROCEDURE — 0WHG43Z INSERTION OF INFUSION DEVICE INTO PERITONEAL CAVITY, PERCUTANEOUS ENDOSCOPIC APPROACH: ICD-10-PCS | Performed by: SURGERY

## 2020-04-29 RX ADMIN — DOCUSATE SODIUM 50 MG AND SENNOSIDES 8.6 MG SCH TAB: 8.6; 5 TABLET, FILM COATED ORAL at 20:26

## 2020-04-29 RX ADMIN — BUPROPION HYDROCHLORIDE SCH MG: 150 TABLET, FILM COATED, EXTENDED RELEASE ORAL at 16:33

## 2020-04-29 RX ADMIN — HYDROCODONE BITARTRATE AND ACETAMINOPHEN PRN TAB: 7.5; 325 TABLET ORAL at 05:55

## 2020-04-29 RX ADMIN — HEPARIN SODIUM SCH: 5000 INJECTION, SOLUTION INTRAVENOUS; SUBCUTANEOUS at 16:45

## 2020-04-29 RX ADMIN — HEPARIN SODIUM SCH UNITS: 5000 INJECTION, SOLUTION INTRAVENOUS; SUBCUTANEOUS at 20:25

## 2020-04-29 RX ADMIN — HYDROCODONE BITARTRATE AND ACETAMINOPHEN PRN TAB: 7.5; 325 TABLET ORAL at 18:00

## 2020-04-29 RX ADMIN — CALCIUM CARBONATE SCH MG: 500 TABLET, CHEWABLE ORAL at 16:33

## 2020-04-29 RX ADMIN — BACITRACIN SCH PK: 500 OINTMENT TOPICAL at 08:37

## 2020-04-29 RX ADMIN — BACITRACIN SCH PK: 500 OINTMENT TOPICAL at 23:40

## 2020-04-29 RX ADMIN — CALCIUM CARBONATE SCH MG: 500 TABLET, CHEWABLE ORAL at 16:45

## 2020-04-29 RX ADMIN — DOCUSATE SODIUM 50 MG AND SENNOSIDES 8.6 MG SCH TAB: 8.6; 5 TABLET, FILM COATED ORAL at 08:35

## 2020-04-29 RX ADMIN — INSULIN HUMAN PRN UNIT: 100 INJECTION, SOLUTION PARENTERAL at 23:39

## 2020-04-29 RX ADMIN — BUPROPION HYDROCHLORIDE SCH: 150 TABLET, FILM COATED, EXTENDED RELEASE ORAL at 11:04

## 2020-04-29 RX ADMIN — INSULIN HUMAN PRN UNITS: 100 INJECTION, SOLUTION PARENTERAL at 16:40

## 2020-04-29 RX ADMIN — BUPROPION HYDROCHLORIDE SCH MG: 150 TABLET, FILM COATED, EXTENDED RELEASE ORAL at 08:37

## 2020-04-29 RX ADMIN — INSULIN HUMAN PRN UNITS: 100 INJECTION, SOLUTION PARENTERAL at 20:24
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
## 2020-04-29 NOTE — OP
DATE OF PROCEDURE:  04/28/2020



PREOPERATIVE DIAGNOSES:  

1. Right lunate palmar dislocation with scapholunate and lunate triquetral ligament

tear. 

2. Volar capsular tear (space of Sury), rupture with complex scapholunate and

lunate triquetral ligament tear. 



PROCEDURES PERFORMED:  

1. Open carpal pinning multifocal with reduction of lunate dislocation.

2. Open scapholunate interosseous membrane reconstruction.

3. Open lunate triquetral ligament tear reconstruction.

4. Median nerve neuroplasty.

5. Volar right wrist capsular repair.

6. C-arm supervision.



TOURNIQUET TIME:  109 minutes, initially up down 35 minutes and up again 27 minutes.



INDICATION:  The patient had acute lunate palmar 100% dislocation, reduced by my

associate Dr. Stewart, to take care of the absolute emergent portion, but then in

urgent fashion, in order to repair his ligaments before they retracted to the point

where they could not be repaired and he will be forced to have a graft, operative

intervention as listed above was indicated. 



DESCRIPTION OF PROCEDURE:  After successful block technique, axillary, the patient

had the limb prepped and draped.  C-arm was brought into the field.  The tourniquet

was inflated after exsanguination of the limb to 250 mmHg pressure. 



We then were able to outline both the palmar, space of Sury incision as well as a

dorsal zigzag incision for primary repair.  We carried the dorsal incision through

skin and subcutaneous tissue and cauterized venous vessels, identified superficial

ulnar and radial nerves and spared them.  After identifying the extensor mechanisms,

we entered the retinaculum in a zigzag fashion and took the 2nd and 3rd compartment

components radially and the 4th and 5th compartment components ulnarly.  We then

noticed that the capsule itself had a hole in it, almost 3 cm.  We then 

the capsule,   __________ligaments from underlying interosseous ligaments and found

that the patient had a small 5 mm dorsal capitate chondral lesion, no

intra-articular midcarpal, scapholunate, or lunate triquetral fractures.  At the

lunotriquetral joint, the lunate triquetral ligament still attached to the

triquetrum and avulsed off the lunate.  The scapholunate was more complex with the

central portion, being a partial tear and a partial avulsion off the lunate and the

dorsal portion being a partial avulsion off the scaphoid with the most dorsal band

being destroyed. This is the dorsal 5 mm. 



We then irrigated the joint.  We debrided any synovium tissue that was nonviable.

We then outlined the reconstruction by 1st bringing up the central portion with a

heavy Prolene, and then placing anchors 1st on the lunate and then on the scaphoid

and then back on the lunate.  We were able to with this, gather excellent tissue on

either side of anchor for later reconstruction. 



A trough had been made in the bone to receive the ligament repair on the side of the

anchor.  The same thing was done over the dorsal one-half of triquetrum and lunate

triquetrum articulation using the anchor trough in bone technique.  Once we had done

this, we irrigated the joint.  Then, we began multiple pinning with the joint

reduced anatomically in the sagittal plane in terms of capitolunate and lunate

radius joint.  Then, we brought initially the scaphoid to the lunate, with no

visible separation clinically and less than a millimeter separation at the midcarpal

joint and 1 mm separation radiocarpal joint of the scapholunate and pinned the

scaphoid to the lunate and the scaphoid to the capitate.  Radiographs showed this in

excellent position in frontal and sagittal plane.  We then pinned the lunate from

the triquetrum into the lunate without  the lunate from the scaphoid.  We

had excellent sagittal plane angles and reproduction of anterior to posterior lines

of congruency.  We then tightened and tied all 5 sutures as listed above with

excellent tension.  We then repaired the very dorsal 5 mm with a band of the capsule

using anchors on both sides of the scapholunate joint. 



We then released the tourniquet.  We closed the capsule with interrupted 3-0 Prolene

in a figure-of-eight pattern.  We obtained hemostasis.  We closed the retinaculum

with 0 Vicryl without undue tension.  Subcutaneous closure was accomplished after

obtaining hemostasis over the dorsal incision with a running 4-0 Monocryl and a 4-0

nylon in a mattress interrupted pattern was used to close the epidermis. 



The patient then had attention turned to the palmar incision, already outlined.  It

had now been 35 minutes since the tourniquet had been released, so we were able to

re-exsanguinate the limb and inflate the tourniquet to 250 mmHg pressure.  The

incision was carried in a zigzag fashion from the proximal one-half of the carpal

tunnel proximally.  We identified the median nerve to include protecting its median

cutaneous branch/palmar cutaneous branch,  from both the palmaris longus

and the underlying tendons, and then dissected down, protected the median nerve

until we saw the space of Sury.  The space of Sury was completely disrupted.

We then irrigated, visualized that we were only repairing the volar capsule

ligaments, and then used a #1 Ethibond to do multiple [id number] to repair the

volar capsule and restore continuity to the space of Sury. 



Once the space of Sury was repaired, we then turned our attention to the wound

closure.  The median nerve had a small area of contusion and approximately 5 to 6 mm

in the center of what was the previous, but now repaired tear in the space of

Sury. 



The hemostasis was obtained.  Tourniquet deflated.  We repaired the skin only with

the epidermis and dermis in one layer using interrupted 3-0 nylon in a mattress

pattern.  Bulky dressing was applied to include a bulky hand dressing in the web

spaces, sugar-tong splint was placed with the wrist in neutral position.  C-arm came

onto the field, confirmed that the K-wires and the capitolunate joint were all in

anatomic position and the patient left the operating room without evidence of

anesthetic or operative complication. 







Job ID:  [id number]
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
## 2020-04-30 VITALS — SYSTOLIC BLOOD PRESSURE: 165 MMHG | DIASTOLIC BLOOD PRESSURE: 75 MMHG

## 2020-04-30 VITALS — TEMPERATURE: 98.4 F

## 2020-04-30 LAB
ANION GAP SERPL CALC-SCNC: 17 MMOL/L (ref 10–20)
BUN SERPL-MCNC: 87 MG/DL (ref 8.9–20.6)
CALCIUM SERPL-MCNC: 7.8 MG/DL (ref 7.8–10.44)
CHLORIDE SERPL-SCNC: 101 MMOL/L (ref 98–107)
CO2 SERPL-SCNC: 23 MMOL/L (ref 22–29)
CREAT CL PREDICTED SERPL C-G-VRATE: 26 ML/MIN (ref 70–130)
GLUCOSE SERPL-MCNC: 217 MG/DL (ref 70–105)
MAGNESIUM SERPL-MCNC: 2.1 MG/DL (ref 1.6–2.6)
POTASSIUM SERPL-SCNC: 4.4 MMOL/L (ref 3.5–5.1)
SODIUM SERPL-SCNC: 137 MMOL/L (ref 136–145)

## 2020-04-30 RX ADMIN — HEPARIN SODIUM SCH UNITS: 5000 INJECTION, SOLUTION INTRAVENOUS; SUBCUTANEOUS at 15:11

## 2020-04-30 RX ADMIN — BUPROPION HYDROCHLORIDE SCH: 150 TABLET, FILM COATED, EXTENDED RELEASE ORAL at 12:38

## 2020-04-30 RX ADMIN — BUPROPION HYDROCHLORIDE SCH MG: 150 TABLET, FILM COATED, EXTENDED RELEASE ORAL at 08:24

## 2020-04-30 RX ADMIN — CALCIUM CARBONATE SCH MG: 500 TABLET, CHEWABLE ORAL at 08:23

## 2020-04-30 RX ADMIN — INSULIN HUMAN PRN UNITS: 100 INJECTION, SOLUTION PARENTERAL at 12:38

## 2020-04-30 RX ADMIN — HYDROCODONE BITARTRATE AND ACETAMINOPHEN PRN TAB: 7.5; 325 TABLET ORAL at 03:27

## 2020-04-30 RX ADMIN — DOCUSATE SODIUM 50 MG AND SENNOSIDES 8.6 MG SCH TAB: 8.6; 5 TABLET, FILM COATED ORAL at 06:16

## 2020-04-30 RX ADMIN — CALCIUM CARBONATE SCH MG: 500 TABLET, CHEWABLE ORAL at 12:38

## 2020-04-30 RX ADMIN — BACITRACIN SCH PK: 500 OINTMENT TOPICAL at 08:24

## 2020-04-30 RX ADMIN — HEPARIN SODIUM SCH UNITS: 5000 INJECTION, SOLUTION INTRAVENOUS; SUBCUTANEOUS at 08:17

## 2020-04-30 RX ADMIN — INSULIN HUMAN PRN UNITS: 100 INJECTION, SOLUTION PARENTERAL at 06:16
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
## 2020-05-12 ENCOUNTER — HOSPITAL ENCOUNTER (OUTPATIENT)
Dept: HOSPITAL 92 - BICCT | Age: 45
Discharge: HOME | End: 2020-05-12
Attending: ORTHOPAEDIC SURGERY
Payer: COMMERCIAL

## 2020-05-12 DIAGNOSIS — S93.491D: ICD-10-CM

## 2020-05-12 DIAGNOSIS — M77.31: ICD-10-CM

## 2020-05-12 DIAGNOSIS — S93.421D: ICD-10-CM

## 2020-05-12 DIAGNOSIS — M89.8X7: ICD-10-CM

## 2020-05-12 DIAGNOSIS — S92.001A: Primary | ICD-10-CM

## 2020-05-12 DIAGNOSIS — M77.51: ICD-10-CM

## 2020-05-12 DIAGNOSIS — S92.101A: ICD-10-CM

## 2020-05-12 NOTE — CT
CT RIGHT ANKLE WITHOUT CONTRAST:

5/12/20

 

HISTORY:

Medial ankle pain.

 

COMPARISON:

None.

 

FINDINGS: 

 

BONES:

There is enthesopathic change of the medial malleolus with abnormal small flakes of bone within the p
roximal and distal deep deltoid ligament, the anterior and posterior tibiotalar ligaments. Evidence o
f an old avulsion injury of the medial malleolus posterior aspect at the deep posterior tibiotalar li
gament. There are capsular calcifications of the dorsal aspect of the capsule at the navicular neck. 
There is calcifications of the dorsal talonavicular ligament from degenerative change.

 

Small os trigonum is present.

 

Along the lateral ankle there is some ossification of the posterior talofibular ligament. No syndesmo
tic widening is appreciated. 

 

The Lisfranc alignment is normal. No midfoot fracture.

 

TENDONS:

There is normal location of the peroneal tendons although there is abnormal thickening of the peroneu
s longus at the lateral malleolus. Flexor tendons appear to be intact as well as the extensor tendons
. 

 

MUSCLES: 

Muscle bulk is normal. 

 

SOFT TISSUES:

There is circumferential soft tissue swelling in the subcutaneous fat at the ankle. Moderate vascular
 calcifications. 

 

IMPRESSION: 

1.      Evidence of old injury at the deep deltoid ligament anterior and posterior segments. 

2.      Old injury of the posterior talofibular ligament. 

3.      No syndesmotic widening.

4.      Mild dorsal capsular calcifications of the talar neck as well as anterior tibial plafond osse
ous spur likely from prior capsular injury. 

5.      Small os trigonum. 

6.      Arterial medial sclerosis, mild. 

7.      Small plantar and dorsal calcaneal spurs. 

8.      Mildly thickened peroneus longus tendon likely tendinosis with interstitial tearing. 

 

 

 

POS: HOME 5

## 2020-05-26 ENCOUNTER — HOSPITAL ENCOUNTER (OUTPATIENT)
Dept: HOSPITAL 92 - TBSIIMAG | Age: 45
Discharge: HOME | End: 2020-05-26
Attending: PHYSICIAN ASSISTANT
Payer: SELF-PAY

## 2020-05-26 DIAGNOSIS — S12.9XXA: Primary | ICD-10-CM

## 2020-05-26 PROCEDURE — 72040 X-RAY EXAM NECK SPINE 2-3 VW: CPT

## 2020-05-26 NOTE — RAD
CERVICAL SPINE AP, LATERAL STANDARD:

5/26/20

 

HISTORY: 

Fracture.

 

COMPARISON: 

CT 4/26/2020. 

 

FINDINGS: 

No acute superimposed fracture or malalignment. The C7 articular pillar fracture is not well seen. Ri
ght third rib fracture is likely healed. 

 

No significant listhesis. Mild narrowing of the C3-4 and C5-6 disc spaces. 

 

IMPRESSION: 

Nonvisualization of the right C7 radicular pillar fracture which extends to the lamina. 

 

POS: HOME

## 2020-06-17 ENCOUNTER — HOSPITAL ENCOUNTER (OUTPATIENT)
Dept: HOSPITAL 92 - TBSIIMAG | Age: 45
Discharge: HOME | End: 2020-06-17
Attending: NEUROLOGICAL SURGERY
Payer: COMMERCIAL

## 2020-06-17 DIAGNOSIS — S12.9XXA: Primary | ICD-10-CM

## 2020-06-17 PROCEDURE — 72050 X-RAY EXAM NECK SPINE 4/5VWS: CPT

## 2020-06-17 NOTE — RAD
Exam: 

6 VIEWS CERVICAL SPINE: 



HISTORY: Cervical spine fracture.



COMPARISON: None.



TECHNIQUE: AP, open-mouth, lateral neutral, swimmer's, flexion and extension views are submitted for 
interpretation.



FINDINGS:

On the open-mouth projection, Limited evaluation due to overlying osseous structures. Lateral masses 
of C1 and C2 articulate appropriately. Odontoid process appears to be intact.

On the AP projection, no malalignment.

Swimmer's view limits evaluation of the cervicothoracic junction.

In the lateral neutral position, there is straightening of cervical lordosis. No evidence of fracture
. Disc space heights are preserved.

Predental space is normal. No prevertebral soft tissue swelling. No abnormal motion upon extension or
 flexion.

Known right facet fracture at C7 is difficult to appreciate on the current examination.



IMPRESSION: 

No radiographic evidence of known right facet fracture at C7.



Transcribed Date/Time: 6/17/2020 9:58 AM



Reported By: Sonya Lopes 

Electronically Signed:  6/17/2020 11:05 AM

## 2020-06-23 ENCOUNTER — HOSPITAL ENCOUNTER (OUTPATIENT)
Dept: HOSPITAL 92 - LABBT | Age: 45
Discharge: HOME | End: 2020-06-23
Attending: ORTHOPAEDIC SURGERY
Payer: COMMERCIAL

## 2020-06-23 DIAGNOSIS — Z11.59: ICD-10-CM

## 2020-06-23 DIAGNOSIS — Z01.812: Primary | ICD-10-CM

## 2020-06-23 DIAGNOSIS — T84.84XA: ICD-10-CM

## 2020-06-23 LAB
ANION GAP SERPL CALC-SCNC: 13 MMOL/L (ref 10–20)
BUN SERPL-MCNC: 66 MG/DL (ref 8.9–20.6)
CALCIUM SERPL-MCNC: 8.3 MG/DL (ref 7.8–10.44)
CHLORIDE SERPL-SCNC: 107 MMOL/L (ref 98–107)
CO2 SERPL-SCNC: 22 MMOL/L (ref 22–29)
CREAT CL PREDICTED SERPL C-G-VRATE: 0 ML/MIN (ref 70–130)
GLUCOSE SERPL-MCNC: 148 MG/DL (ref 70–105)
HGB BLD-MCNC: 12.1 G/DL (ref 14–18)
MCH RBC QN AUTO: 29.8 PG (ref 27–31)
MCV RBC AUTO: 89.6 FL (ref 78–98)
PLATELET # BLD AUTO: 194 THOU/UL (ref 130–400)
POTASSIUM SERPL-SCNC: 4.4 MMOL/L (ref 3.5–5.1)
RBC # BLD AUTO: 4.05 MILL/UL (ref 4.7–6.1)
SODIUM SERPL-SCNC: 138 MMOL/L (ref 136–145)
WBC # BLD AUTO: 6.7 THOU/UL (ref 4.8–10.8)

## 2020-06-23 PROCEDURE — 93010 ELECTROCARDIOGRAM REPORT: CPT

## 2020-06-23 PROCEDURE — 85027 COMPLETE CBC AUTOMATED: CPT

## 2020-06-23 PROCEDURE — 87635 SARS-COV-2 COVID-19 AMP PRB: CPT

## 2020-06-23 PROCEDURE — U0003 INFECTIOUS AGENT DETECTION BY NUCLEIC ACID (DNA OR RNA); SEVERE ACUTE RESPIRATORY SYNDROME CORONAVIRUS 2 (SARS-COV-2) (CORONAVIRUS DISEASE [COVID-19]), AMPLIFIED PROBE TECHNIQUE, MAKING USE OF HIGH THROUGHPUT TECHNOLOGIES AS DESCRIBED BY CMS-2020-01-R: HCPCS

## 2020-06-23 PROCEDURE — 80048 BASIC METABOLIC PNL TOTAL CA: CPT

## 2020-06-23 PROCEDURE — 93005 ELECTROCARDIOGRAM TRACING: CPT

## 2020-06-26 ENCOUNTER — HOSPITAL ENCOUNTER (OUTPATIENT)
Dept: HOSPITAL 92 - SDC | Age: 45
Discharge: HOME | End: 2020-06-26
Attending: ORTHOPAEDIC SURGERY
Payer: SELF-PAY

## 2020-06-26 VITALS — BODY MASS INDEX: 33.2 KG/M2

## 2020-06-26 DIAGNOSIS — I12.0: ICD-10-CM

## 2020-06-26 DIAGNOSIS — Z79.899: ICD-10-CM

## 2020-06-26 DIAGNOSIS — Z79.4: ICD-10-CM

## 2020-06-26 DIAGNOSIS — F17.210: ICD-10-CM

## 2020-06-26 DIAGNOSIS — E78.00: ICD-10-CM

## 2020-06-26 DIAGNOSIS — N18.6: ICD-10-CM

## 2020-06-26 DIAGNOSIS — J45.909: ICD-10-CM

## 2020-06-26 DIAGNOSIS — E11.22: ICD-10-CM

## 2020-06-26 DIAGNOSIS — T84.84XA: Primary | ICD-10-CM

## 2020-06-26 PROCEDURE — S0020 INJECTION, BUPIVICAINE HYDRO: HCPCS

## 2020-06-26 PROCEDURE — 0XP60YZ REMOVAL OF OTHER DEVICE FROM RIGHT UPPER EXTREMITY, OPEN APPROACH: ICD-10-PCS | Performed by: ORTHOPAEDIC SURGERY

## 2020-06-26 PROCEDURE — 76000 FLUOROSCOPY <1 HR PHYS/QHP: CPT

## 2020-06-26 PROCEDURE — 36416 COLLJ CAPILLARY BLOOD SPEC: CPT

## 2020-06-26 NOTE — OP
DATE OF PROCEDURE:  06/26/2020



PREOPERATIVE DIAGNOSES:  

1. Painful deep wires, radial aspect of wrist.

2. Painful deep wires, ulnar aspect of the wrist.



POSTOPERATIVE DIAGNOSES:  

1. Painful deep wires, radial aspect of wrist.

2. Painful deep wires, ulnar aspect of the wrist.



FINDINGS:  

1. Wires removed via 2 separate incisions on 2 different sides of the wrist.

2. Once wires removed, stress views in all directions, ulnar deviation, radial

deviation, supination, pronation, dorsiflexion, palmar flexion showed no separation

of the scapholunate or lunotriquetral intervals, and there was no intercalated

instability seen. 



PROCEDURES PERFORMED:  

1. Removal of 2 radial wires deep at the right wrist.

2. Removal of 1 ulnar wire deep at the right wrist.

3. C-arm supervision.



TOURNIQUET TIME:  None.



ESTIMATED BLOOD LOSS:  Less than 5 mL.



INJECTABLE:  Yes.  Total of 10 mL of 0.5% Marcaine, divided between the 2 incisions

before the procedure and then another 10 divided equally after the incisions. 



INDICATIONS:  The patient is now 9 weeks since complex lunate palmar dislocation,

requiring lunotriquetral and scapholunate reconstructions and multifocal pinning.

Healing is upon us and the wires are removed because they are causing more pain than

probable good at this point. 



DESCRIPTION OF PROCEDURE:  After successful general LMA technique, the limb was

prepped and draped.  C-arm brought into the field, identified the sites and then

injected over the regions with 5 mL of 0.5% Marcaine with epinephrine on either side

of the wrist.  We first approached the radial side with 2 individual incisions, each

5 mm, over their pin sites respectively.  Dissected down to the pin sites,

maintained hemostasis and removed them with deep penetrating large needle saldana. 



Then, we turned attention to the ulnar side of the wrist via separate incision,

again localized by C-arm to make a small, approximately 5 mm incision over the

region where the wire was found, dissected down deep and had to slightly open the

sheath of the extensor carpi ulnaris because the pin was actually deep to this

tendon.  Thus, this deep wire was removed through the same technique and in similar

depth, making the deep implant removal.  We removed the wire.  We then took stress

views in ulnar deviation, radial deviation, supination, pronation, dorsiflexion,

palmar flexion, and there was no separation of the scapholunate or lunotriquetral

intervals.  The lunate remained well within the fossa without intercalated

instability.  We obtained hemostasis, closed the wounds with interrupted 4-0 chromic

in a simple pattern and finished the injection as described above.  Placed him in

antibiotic dressing, bacitracin, Adaptic, and then a palmar splint and the patient

left the operating room without evidence of anesthetic or operative complications. 







Job ID:  [id number]

## 2020-06-26 NOTE — RAD
EXAM: 2 views of the right wrist



HISTORY: Wrist pain



COMPARISON: 4/28/2020



FINDINGS/IMPRESSION: The previously seen K wires have been removed. Multiple bone anchors are seen in
 the proximal carpal row. There is normal alignment of the wrist joint.



Reported By: Shay Bridges 

Electronically Signed:  6/26/2020 12:25 PM

## 2020-10-13 ENCOUNTER — HOSPITAL ENCOUNTER (OUTPATIENT)
Dept: HOSPITAL 92 - LABBT | Age: 45
Discharge: HOME | End: 2020-10-13
Attending: ORTHOPAEDIC SURGERY
Payer: COMMERCIAL

## 2020-10-13 DIAGNOSIS — Z01.812: Primary | ICD-10-CM

## 2020-10-13 DIAGNOSIS — G56.11: ICD-10-CM

## 2020-10-13 DIAGNOSIS — G56.01: ICD-10-CM

## 2020-10-13 DIAGNOSIS — Z20.828: ICD-10-CM

## 2020-10-13 LAB
BASOPHILS # BLD AUTO: 0.1 THOU/UL (ref 0–0.2)
BASOPHILS NFR BLD AUTO: 1.2 % (ref 0–1)
EOSINOPHIL # BLD AUTO: 0.3 THOU/UL (ref 0–0.7)
EOSINOPHIL NFR BLD AUTO: 4.1 % (ref 0–10)
HGB BLD-MCNC: 9.4 G/DL (ref 14–18)
LYMPHOCYTES # BLD: 1.6 THOU/UL (ref 1.2–3.4)
LYMPHOCYTES NFR BLD AUTO: 22.5 % (ref 21–51)
MCH RBC QN AUTO: 31.7 PG (ref 27–31)
MCV RBC AUTO: 91.3 FL (ref 78–98)
MONOCYTES # BLD AUTO: 0.7 THOU/UL (ref 0.11–0.59)
MONOCYTES NFR BLD AUTO: 10.2 % (ref 0–10)
NEUTROPHILS # BLD AUTO: 4.3 THOU/UL (ref 1.4–6.5)
NEUTROPHILS NFR BLD AUTO: 62.1 % (ref 42–75)
PLATELET # BLD AUTO: 157 THOU/UL (ref 130–400)
RBC # BLD AUTO: 2.98 MILL/UL (ref 4.7–6.1)
WBC # BLD AUTO: 6.9 THOU/UL (ref 4.8–10.8)

## 2020-10-13 PROCEDURE — 87635 SARS-COV-2 COVID-19 AMP PRB: CPT

## 2020-10-13 PROCEDURE — U0003 INFECTIOUS AGENT DETECTION BY NUCLEIC ACID (DNA OR RNA); SEVERE ACUTE RESPIRATORY SYNDROME CORONAVIRUS 2 (SARS-COV-2) (CORONAVIRUS DISEASE [COVID-19]), AMPLIFIED PROBE TECHNIQUE, MAKING USE OF HIGH THROUGHPUT TECHNOLOGIES AS DESCRIBED BY CMS-2020-01-R: HCPCS

## 2020-10-13 PROCEDURE — 85025 COMPLETE CBC W/AUTO DIFF WBC: CPT

## 2020-10-16 ENCOUNTER — HOSPITAL ENCOUNTER (OUTPATIENT)
Dept: HOSPITAL 92 - SDC | Age: 45
Discharge: HOME | End: 2020-10-16
Attending: ORTHOPAEDIC SURGERY
Payer: COMMERCIAL

## 2020-10-16 VITALS — BODY MASS INDEX: 34.2 KG/M2

## 2020-10-16 DIAGNOSIS — Z53.09: ICD-10-CM

## 2020-10-16 DIAGNOSIS — Z99.2: ICD-10-CM

## 2020-10-16 DIAGNOSIS — E11.22: ICD-10-CM

## 2020-10-16 DIAGNOSIS — G56.11: ICD-10-CM

## 2020-10-16 DIAGNOSIS — Z79.82: ICD-10-CM

## 2020-10-16 DIAGNOSIS — Z79.4: ICD-10-CM

## 2020-10-16 DIAGNOSIS — Z79.899: ICD-10-CM

## 2020-10-16 DIAGNOSIS — N18.6: ICD-10-CM

## 2020-10-16 DIAGNOSIS — J45.909: ICD-10-CM

## 2020-10-16 DIAGNOSIS — I12.0: ICD-10-CM

## 2020-10-16 DIAGNOSIS — E78.00: ICD-10-CM

## 2020-10-16 DIAGNOSIS — G56.01: Primary | ICD-10-CM

## 2020-10-16 LAB
ANION GAP SERPL CALC-SCNC: 16 MMOL/L (ref 10–20)
BUN SERPL-MCNC: 74 MG/DL (ref 8.9–20.6)
CALCIUM SERPL-MCNC: 8.2 MG/DL (ref 7.8–10.44)
CHLORIDE SERPL-SCNC: 107 MMOL/L (ref 98–107)
CO2 SERPL-SCNC: 23 MMOL/L (ref 22–29)
CREAT CL PREDICTED SERPL C-G-VRATE: 23 ML/MIN (ref 70–130)
GLUCOSE SERPL-MCNC: 164 MG/DL (ref 70–105)
POTASSIUM SERPL-SCNC: 7.6 MMOL/L (ref 3.5–5.1)
SODIUM SERPL-SCNC: 138 MMOL/L (ref 136–145)

## 2020-10-16 PROCEDURE — 93005 ELECTROCARDIOGRAM TRACING: CPT

## 2020-10-16 PROCEDURE — 80048 BASIC METABOLIC PNL TOTAL CA: CPT

## 2020-10-16 PROCEDURE — 93010 ELECTROCARDIOGRAM REPORT: CPT

## 2020-10-16 PROCEDURE — 36415 COLL VENOUS BLD VENIPUNCTURE: CPT

## 2020-10-16 PROCEDURE — S0020 INJECTION, BUPIVICAINE HYDRO: HCPCS

## 2020-10-22 ENCOUNTER — HOSPITAL ENCOUNTER (OUTPATIENT)
Dept: HOSPITAL 92 - LABBT | Age: 45
Discharge: HOME | End: 2020-10-22
Attending: ORTHOPAEDIC SURGERY
Payer: COMMERCIAL

## 2020-10-22 DIAGNOSIS — G56.01: ICD-10-CM

## 2020-10-22 DIAGNOSIS — Z01.812: Primary | ICD-10-CM

## 2020-10-22 DIAGNOSIS — Z20.828: ICD-10-CM

## 2020-10-22 DIAGNOSIS — G56.11: ICD-10-CM

## 2020-10-22 LAB
ANION GAP SERPL CALC-SCNC: 16 MMOL/L (ref 10–20)
BASOPHILS # BLD AUTO: 0 THOU/UL (ref 0–0.2)
BASOPHILS NFR BLD AUTO: 0.4 % (ref 0–1)
BUN SERPL-MCNC: 77 MG/DL (ref 8.9–20.6)
CALCIUM SERPL-MCNC: 8.1 MG/DL (ref 7.8–10.44)
CHLORIDE SERPL-SCNC: 107 MMOL/L (ref 98–107)
CO2 SERPL-SCNC: 24 MMOL/L (ref 22–29)
CREAT CL PREDICTED SERPL C-G-VRATE: 0 ML/MIN (ref 70–130)
EOSINOPHIL # BLD AUTO: 0.3 THOU/UL (ref 0–0.7)
EOSINOPHIL NFR BLD AUTO: 4.5 % (ref 0–10)
GLUCOSE SERPL-MCNC: 64 MG/DL (ref 70–105)
HGB BLD-MCNC: 9.5 G/DL (ref 14–18)
LYMPHOCYTES # BLD: 1.4 THOU/UL (ref 1.2–3.4)
LYMPHOCYTES NFR BLD AUTO: 24.3 % (ref 21–51)
MCH RBC QN AUTO: 31.2 PG (ref 27–31)
MCV RBC AUTO: 93.1 FL (ref 78–98)
MONOCYTES # BLD AUTO: 0.7 THOU/UL (ref 0.11–0.59)
MONOCYTES NFR BLD AUTO: 12.2 % (ref 0–10)
NEUTROPHILS # BLD AUTO: 3.4 THOU/UL (ref 1.4–6.5)
NEUTROPHILS NFR BLD AUTO: 58.7 % (ref 42–75)
PLATELET # BLD AUTO: 162 THOU/UL (ref 130–400)
POTASSIUM SERPL-SCNC: 5 MMOL/L (ref 3.5–5.1)
RBC # BLD AUTO: 3.04 MILL/UL (ref 4.7–6.1)
SODIUM SERPL-SCNC: 142 MMOL/L (ref 136–145)
WBC # BLD AUTO: 5.8 THOU/UL (ref 4.8–10.8)

## 2020-10-22 PROCEDURE — U0003 INFECTIOUS AGENT DETECTION BY NUCLEIC ACID (DNA OR RNA); SEVERE ACUTE RESPIRATORY SYNDROME CORONAVIRUS 2 (SARS-COV-2) (CORONAVIRUS DISEASE [COVID-19]), AMPLIFIED PROBE TECHNIQUE, MAKING USE OF HIGH THROUGHPUT TECHNOLOGIES AS DESCRIBED BY CMS-2020-01-R: HCPCS

## 2020-10-22 PROCEDURE — 80048 BASIC METABOLIC PNL TOTAL CA: CPT

## 2020-10-22 PROCEDURE — 85025 COMPLETE CBC W/AUTO DIFF WBC: CPT

## 2020-10-22 PROCEDURE — 87635 SARS-COV-2 COVID-19 AMP PRB: CPT

## 2020-10-23 NOTE — EKG
Test Reason : PREOP

Blood Pressure : ***/*** mmHG

Vent. Rate : 062 BPM     Atrial Rate : 062 BPM

   P-R Int : 182 ms          QRS Dur : 106 ms

    QT Int : 430 ms       P-R-T Axes : 041 042 039 degrees

   QTc Int : 436 ms

 

Normal sinus rhythm

Normal ECG

No previous ECGs available

Confirmed by DR. MÓNCIA GOMEZ ([id number]) on 10/23/2020 1:19:34 PM

 

Referred By:  URIEL           Confirmed By:DR. MÓNICA GOMEZ

## 2020-10-26 ENCOUNTER — HOSPITAL ENCOUNTER (OUTPATIENT)
Dept: HOSPITAL 92 - SDC | Age: 45
Discharge: HOME | End: 2020-10-26
Attending: ORTHOPAEDIC SURGERY
Payer: COMMERCIAL

## 2020-10-26 VITALS — BODY MASS INDEX: 33.5 KG/M2

## 2020-10-26 DIAGNOSIS — M65.831: ICD-10-CM

## 2020-10-26 DIAGNOSIS — G56.11: Primary | ICD-10-CM

## 2020-10-26 LAB
ALBUMIN SERPL BCG-MCNC: 3.6 G/DL (ref 3.5–5)
ALP SERPL-CCNC: 101 U/L (ref 40–110)
ALT SERPL W P-5'-P-CCNC: 21 U/L (ref 8–55)
ANION GAP SERPL CALC-SCNC: 15 MMOL/L (ref 10–20)
AST SERPL-CCNC: 15 U/L (ref 5–34)
BILIRUB SERPL-MCNC: 0.4 MG/DL (ref 0.2–1.2)
BUN SERPL-MCNC: 66 MG/DL (ref 8.9–20.6)
CALCIUM SERPL-MCNC: 8.5 MG/DL (ref 7.8–10.44)
CHLORIDE SERPL-SCNC: 105 MMOL/L (ref 98–107)
CO2 SERPL-SCNC: 23 MMOL/L (ref 22–29)
CREAT CL PREDICTED SERPL C-G-VRATE: 27 ML/MIN (ref 70–130)
GLOBULIN SER CALC-MCNC: 2.7 G/DL (ref 2.4–3.5)
GLUCOSE SERPL-MCNC: 173 MG/DL (ref 70–105)
POTASSIUM SERPL-SCNC: 5.3 MMOL/L (ref 3.5–5.1)
SODIUM SERPL-SCNC: 138 MMOL/L (ref 136–145)

## 2020-10-26 PROCEDURE — S0028 INJECTION, FAMOTIDINE, 20 MG: HCPCS

## 2020-10-26 PROCEDURE — C9352 NEURAGEN NERVE GUIDE, PER CM: HCPCS

## 2020-10-26 PROCEDURE — 0LB70ZZ EXCISION OF RIGHT HAND TENDON, OPEN APPROACH: ICD-10-PCS | Performed by: ORTHOPAEDIC SURGERY

## 2020-10-26 PROCEDURE — 01N50ZZ RELEASE MEDIAN NERVE, OPEN APPROACH: ICD-10-PCS | Performed by: ORTHOPAEDIC SURGERY

## 2020-10-26 PROCEDURE — S0020 INJECTION, BUPIVICAINE HYDRO: HCPCS

## 2020-10-26 PROCEDURE — 36415 COLL VENOUS BLD VENIPUNCTURE: CPT

## 2020-10-26 PROCEDURE — 80053 COMPREHEN METABOLIC PANEL: CPT

## 2020-10-26 PROCEDURE — 88305 TISSUE EXAM BY PATHOLOGIST: CPT

## 2020-10-27 NOTE — OP
DATE OF PROCEDURE:  10/26/2020



TOURNIQUET TIME:  49 minutes.



ESTIMATED BLOOD LOSS:  20 mL.



PREOPERATIVE DIAGNOSES:  

1. Right median nerve compression, forearm.

2. Right median nerve compression, wrist and carpal tunnel.

3. Severe flexor digitorum profundus tenosynovitis.

4. Severe flexor digitorum superficialis tenosynovitis.

5. Severe flexor pollicis longus tenosynovitis.



POSTOPERATIVE DIAGNOSES:  

1. Right median nerve compression, forearm.

2. Right median nerve compression, wrist and carpal tunnel.

3. Severe flexor digitorum profundus tenosynovitis.

4. Severe flexor digitorum superficialis tenosynovitis.

5. Severe flexor pollicis longus tenosynovitis.



PROCEDURES PERFORMED:  

1. Median nerve neuroplasty under magnification, right wrist.

2. Median nerve neuroplasty under magnification, right forearm.

3. Median nerve carpal tunnel release.

4. Flexor digitorum superficialis radical flexor tenosynovectomy, all branches.

5. Flexor digitorum profundus radical flexor tenosynovectomy, all branches.

6. Flexor pollicis longus radical flexor tenosynovectomy.

7. Application of 10 mm x 4 cm (4 cm long/10 mm diameter) NexGen nerve sleeve for

protection. 



FINDINGS:  A 1 cm extreme area of hourglass formation with marked erythema and

stippling just distal to the repair performed for the lunate dislocation. 



INDICATIONS FOR PROCEDURE:  The patient is now 4 months after open lunate

dislocation where he had some bruise on the median nerve, but now complains of

persistent increase in numbness and tingling in the median nerve distribution.

Positive EMGs.  No relief with conservative treatment.  For this reason,

exploration, decompression, and possible repairs plus or minus microscope plus or

minus sleeve was indicated. 



DESCRIPTION OF PROCEDURE:  After successful general endotracheal anesthesia, the

limb was prepped and draped.  The patient then had time-out done appropriately.  The

patient had the previous incision extended 2 cm distal to include the entire palmar

forearm to the level of Lyman cardinal line and 2 cm proximal.  We carried the

incision through skin and subcutaneous tissue.  Medially under the skin, there was

marked scarring of the flexor pollicis longus and the flexor digitorum superficialis

and profundus.  The palmaris was also scarred to the median nerve.  We then

 the median nerve scarring under magnification without doing a microscopic

internal neurolysis.  Once we had done this, the median nerve was free.  We then

extended incision distally and performed a carpal tunnel release for remainder

portion of the carpal tunnel.  In the distal-third of carpal tunnel, there was

actual 1 cm area so much nerve compression.  There was hourglass formation of almost

50% narrowing of the median nerve diameter with erythema and stippling.  We then

trimmed all of the scarring in the transverse carpal ligament off the median nerve,

leaving almost a 5 mm gap where the previous transcarpal ligament had been.  We

explored this out to the motor branch and saw the motor branch had an intertendinous

takeoff.  This was discovered and we spared the damage as we released the transverse

carpal ligament. 



The flexor pollicis longus was then inspected once the median nerve completely freed

to 160 degrees including 2 cm distal and 2 cm proximal to the area of complete

stippling and hourglass formation.  There was no compression of the nerve as it

exited the flexor digitorum profundus and superficialis muscle belly sandwich that

it normally lives in. 



The patient had the palmaris longus undergo flexor tenosynovectomy.  Then, we

performed it for the flexor digitorum superficialis all branches and flexor

digitorum profundus all branches.  Then, we were able to perform a detail for flexor

carpi radialis.  This included all the way to the level of the scaphoid.  Now, we

had performed a radical flexor tenosynovectomy of 2/10 cm in length of the flexor

digitorum profundus all branches, flexor digitorum superficialis all branches,

flexor pollicis longus, palmaris longus, and flexor carpi radialis.  The median

nerve showed no evidence of compression and was proximal to the carpal canal.  We

inspected the previous repair and it had healed in the space of pourier. 



We now placed Celestone over the nerve in the area where it was most stenotic and

held the moist towel here for 3 minutes.  Then, we released the tourniquet.  We

obtained hemostasis.  We placed the nerve conduit just 2 mm proximal to the takeoff

of motor branch and then centered on the area of worse changes.  Once this was done,

we tied it with a 6-0 Prolene in 3 places without migrating.  We finished the

hemostasis and we closed the incision with interrupted 4-0 nylon from the level of

the volar wrist flexion crease distally in a mattress pattern and interrupted 3-0

nylon from the level of the palmar wrist flexion crease proximally  __________ in a

mattress pattern.  Bulky dressing was applied and before we placed the bacitracin,

Adaptic, 4x4, Kerlix, and gave a sling.  He was given assurance and prepared for

discharge. 







Job ID:  [id number]

## 2021-09-17 ENCOUNTER — HOSPITAL ENCOUNTER (OUTPATIENT)
Dept: HOSPITAL 92 - BICULT | Age: 46
Discharge: HOME | End: 2021-09-17
Attending: INTERNAL MEDICINE
Payer: COMMERCIAL

## 2021-09-17 DIAGNOSIS — K76.6: ICD-10-CM

## 2021-09-17 DIAGNOSIS — R94.5: Primary | ICD-10-CM

## 2021-09-17 DIAGNOSIS — R16.1: ICD-10-CM

## 2021-09-17 PROCEDURE — 76705 ECHO EXAM OF ABDOMEN: CPT

## 2022-08-24 ENCOUNTER — HOSPITAL ENCOUNTER (EMERGENCY)
Dept: HOSPITAL 92 - ERS | Age: 47
Discharge: HOME | End: 2022-08-24
Payer: COMMERCIAL

## 2022-08-24 DIAGNOSIS — Z87.891: ICD-10-CM

## 2022-08-24 DIAGNOSIS — I12.0: ICD-10-CM

## 2022-08-24 DIAGNOSIS — D63.1: ICD-10-CM

## 2022-08-24 DIAGNOSIS — E11.22: Primary | ICD-10-CM

## 2022-08-24 DIAGNOSIS — Z79.899: ICD-10-CM

## 2022-08-24 DIAGNOSIS — Z79.4: ICD-10-CM

## 2022-08-24 DIAGNOSIS — E78.5: ICD-10-CM

## 2022-08-24 DIAGNOSIS — N18.6: ICD-10-CM

## 2022-08-24 LAB
ALBUMIN SERPL BCG-MCNC: 3.8 G/DL (ref 3.5–5)
ALP SERPL-CCNC: 91 U/L (ref 40–110)
ALT SERPL W P-5'-P-CCNC: 16 U/L (ref 8–55)
ANION GAP SERPL CALC-SCNC: 18 MMOL/L (ref 10–20)
AST SERPL-CCNC: 18 U/L (ref 5–34)
BASOPHILS # BLD AUTO: 0 THOU/UL (ref 0–0.2)
BASOPHILS NFR BLD AUTO: 0.3 % (ref 0–1)
BILIRUB SERPL-MCNC: 0.4 MG/DL (ref 0.2–1.2)
BUN SERPL-MCNC: 85 MG/DL (ref 8.9–20.6)
CALCIUM SERPL-MCNC: 8.4 MG/DL (ref 7.8–10.44)
CHLORIDE SERPL-SCNC: 106 MMOL/L (ref 98–107)
CO2 SERPL-SCNC: 22 MMOL/L (ref 22–29)
CREAT CL PREDICTED SERPL C-G-VRATE: 0 ML/MIN (ref 70–130)
EOSINOPHIL # BLD AUTO: 0.3 THOU/UL (ref 0–0.7)
EOSINOPHIL NFR BLD AUTO: 2.9 % (ref 0–10)
GLOBULIN SER CALC-MCNC: 2.6 G/DL (ref 2.4–3.5)
GLUCOSE SERPL-MCNC: 119 MG/DL (ref 70–105)
HGB BLD-MCNC: 7.7 G/DL (ref 14–18)
LYMPHOCYTES # BLD: 1.1 THOU/UL (ref 1.2–3.4)
LYMPHOCYTES NFR BLD AUTO: 11.7 % (ref 21–51)
MCH RBC QN AUTO: 32.5 PG (ref 27–31)
MCV RBC AUTO: 97.7 FL (ref 78–98)
MONOCYTES # BLD AUTO: 0.8 THOU/UL (ref 0.11–0.59)
MONOCYTES NFR BLD AUTO: 8.4 % (ref 0–10)
NEUTROPHILS # BLD AUTO: 7.2 THOU/UL (ref 1.4–6.5)
NEUTROPHILS NFR BLD AUTO: 76.7 % (ref 42–75)
PLATELET # BLD AUTO: 221 THOU/UL (ref 130–400)
POTASSIUM SERPL-SCNC: 4.2 MMOL/L (ref 3.5–5.1)
RBC # BLD AUTO: 2.38 MILL/UL (ref 4.7–6.1)
SODIUM SERPL-SCNC: 142 MMOL/L (ref 136–145)
WBC # BLD AUTO: 9.4 THOU/UL (ref 4.8–10.8)

## 2022-08-24 PROCEDURE — 86901 BLOOD TYPING SEROLOGIC RH(D): CPT

## 2022-08-24 PROCEDURE — 36415 COLL VENOUS BLD VENIPUNCTURE: CPT

## 2022-08-24 PROCEDURE — 99283 EMERGENCY DEPT VISIT LOW MDM: CPT

## 2022-08-24 PROCEDURE — P9016 RBC LEUKOCYTES REDUCED: HCPCS

## 2022-08-24 PROCEDURE — 86900 BLOOD TYPING SEROLOGIC ABO: CPT

## 2022-08-24 PROCEDURE — 86850 RBC ANTIBODY SCREEN: CPT

## 2022-08-24 PROCEDURE — 80053 COMPREHEN METABOLIC PANEL: CPT

## 2022-08-24 PROCEDURE — 36430 TRANSFUSION BLD/BLD COMPNT: CPT

## 2022-09-23 ENCOUNTER — HOSPITAL ENCOUNTER (OUTPATIENT)
Dept: HOSPITAL 92 - SDC | Age: 47
Discharge: HOME | End: 2022-09-23
Attending: INTERNAL MEDICINE
Payer: COMMERCIAL

## 2022-09-23 VITALS — BODY MASS INDEX: 31.8 KG/M2

## 2022-09-23 DIAGNOSIS — N18.9: ICD-10-CM

## 2022-09-23 DIAGNOSIS — Z79.84: ICD-10-CM

## 2022-09-23 DIAGNOSIS — K31.811: Primary | ICD-10-CM

## 2022-09-23 DIAGNOSIS — Z87.891: ICD-10-CM

## 2022-09-23 DIAGNOSIS — E11.22: ICD-10-CM

## 2022-09-23 DIAGNOSIS — Z79.899: ICD-10-CM

## 2022-09-23 DIAGNOSIS — I12.9: ICD-10-CM

## 2022-09-23 DIAGNOSIS — D62: ICD-10-CM

## 2022-09-23 LAB
ANION GAP SERPL CALC-SCNC: 16 MMOL/L (ref 10–20)
BUN SERPL-MCNC: 67 MG/DL (ref 8.9–20.6)
CALCIUM SERPL-MCNC: 8.1 MG/DL (ref 7.8–10.44)
CHLORIDE SERPL-SCNC: 108 MMOL/L (ref 98–107)
CO2 SERPL-SCNC: 19 MMOL/L (ref 22–29)
CREAT CL PREDICTED SERPL C-G-VRATE: 16 ML/MIN (ref 70–130)
GLUCOSE SERPL-MCNC: 143 MG/DL (ref 70–105)
HGB BLD-MCNC: 8.4 G/DL (ref 14–18)
PLATELET # BLD AUTO: 156 THOU/UL (ref 130–400)
POTASSIUM SERPL-SCNC: 5 MMOL/L (ref 3.5–5.1)
SODIUM SERPL-SCNC: 138 MMOL/L (ref 136–145)

## 2022-09-23 PROCEDURE — 85018 HEMOGLOBIN: CPT

## 2022-09-23 PROCEDURE — 85049 AUTOMATED PLATELET COUNT: CPT

## 2022-09-23 PROCEDURE — 80048 BASIC METABOLIC PNL TOTAL CA: CPT

## 2022-09-23 PROCEDURE — 36415 COLL VENOUS BLD VENIPUNCTURE: CPT

## 2022-09-23 PROCEDURE — 85014 HEMATOCRIT: CPT

## 2022-09-23 PROCEDURE — 0W3P8ZZ CONTROL BLEEDING IN GASTROINTESTINAL TRACT, VIA NATURAL OR ARTIFICIAL OPENING ENDOSCOPIC: ICD-10-PCS | Performed by: INTERNAL MEDICINE

## 2022-12-19 ENCOUNTER — HOSPITAL ENCOUNTER (OUTPATIENT)
Dept: HOSPITAL 92 - TBSIIMAG | Age: 47
Discharge: HOME | End: 2022-12-19
Attending: FAMILY MEDICINE
Payer: COMMERCIAL

## 2022-12-19 DIAGNOSIS — R51.9: Primary | ICD-10-CM

## 2022-12-19 PROCEDURE — 70551 MRI BRAIN STEM W/O DYE: CPT

## 2024-07-09 ENCOUNTER — APPOINTMENT (RX ONLY)
Dept: URBAN - METROPOLITAN AREA CLINIC 99 | Facility: CLINIC | Age: 49
Setting detail: DERMATOLOGY
End: 2024-07-09

## 2024-07-09 DIAGNOSIS — D18.0 HEMANGIOMA: ICD-10-CM

## 2024-07-09 DIAGNOSIS — D22 MELANOCYTIC NEVI: ICD-10-CM

## 2024-07-09 DIAGNOSIS — L90.5 SCAR CONDITIONS AND FIBROSIS OF SKIN: ICD-10-CM

## 2024-07-09 DIAGNOSIS — L57.8 OTHER SKIN CHANGES DUE TO CHRONIC EXPOSURE TO NONIONIZING RADIATION: ICD-10-CM

## 2024-07-09 DIAGNOSIS — Z71.89 OTHER SPECIFIED COUNSELING: ICD-10-CM

## 2024-07-09 DIAGNOSIS — S90.1 CONTUSION OF TOE WITHOUT DAMAGE TO NAIL: ICD-10-CM | Status: RESOLVING

## 2024-07-09 DIAGNOSIS — L82.1 OTHER SEBORRHEIC KERATOSIS: ICD-10-CM

## 2024-07-09 PROBLEM — D22.5 MELANOCYTIC NEVI OF TRUNK: Status: ACTIVE | Noted: 2024-07-09

## 2024-07-09 PROBLEM — S90.111A CONTUSION OF RIGHT GREAT TOE WITHOUT DAMAGE TO NAIL, INITIAL ENCOUNTER: Status: ACTIVE | Noted: 2024-07-09

## 2024-07-09 PROBLEM — D18.01 HEMANGIOMA OF SKIN AND SUBCUTANEOUS TISSUE: Status: ACTIVE | Noted: 2024-07-09

## 2024-07-09 PROCEDURE — 99203 OFFICE O/P NEW LOW 30 MIN: CPT

## 2024-07-09 PROCEDURE — ? SUNSCREEN RECOMMENDATIONS

## 2024-07-09 PROCEDURE — ? ADDITIONAL NOTES

## 2024-07-09 PROCEDURE — ? COUNSELING

## 2024-07-09 ASSESSMENT — LOCATION ZONE DERM
LOCATION ZONE: NECK
LOCATION ZONE: TOE
LOCATION ZONE: ARM
LOCATION ZONE: SCALP
LOCATION ZONE: TRUNK

## 2024-07-09 ASSESSMENT — LOCATION SIMPLE DESCRIPTION DERM
LOCATION SIMPLE: SCALP
LOCATION SIMPLE: RIGHT WRIST
LOCATION SIMPLE: POSTERIOR NECK
LOCATION SIMPLE: UPPER BACK
LOCATION SIMPLE: RIGHT GREAT TOE
LOCATION SIMPLE: LEFT FOREARM
LOCATION SIMPLE: CHEST
LOCATION SIMPLE: RIGHT FOREARM

## 2024-07-09 ASSESSMENT — LOCATION DETAILED DESCRIPTION DERM
LOCATION DETAILED: RIGHT LATERAL DORSAL WRIST
LOCATION DETAILED: INFERIOR THORACIC SPINE
LOCATION DETAILED: STERNAL NOTCH
LOCATION DETAILED: RIGHT PROXIMAL DORSAL FOREARM
LOCATION DETAILED: MID POSTERIOR NECK
LOCATION DETAILED: SUPERIOR THORACIC SPINE
LOCATION DETAILED: LEFT PROXIMAL DORSAL FOREARM
LOCATION DETAILED: LEFT CENTRAL PARIETAL SCALP
LOCATION DETAILED: LEFT INFERIOR POSTERIOR NECK
LOCATION DETAILED: STERNUM
LOCATION DETAILED: RIGHT DORSAL GREAT TOE
LOCATION DETAILED: LEFT MEDIAL INFERIOR CHEST

## 2024-07-09 NOTE — HPI: SKIN LESION
Is This A New Presentation, Or A Follow-Up?: Skin Lesion
What Type Of Note Output Would You Prefer (Optional)?: Standard Output
How Severe Is Your Skin Lesion?: moderate
Has Your Skin Lesion Been Treated?: not been treated
Additional History: Pt has a history of a kidney transplant and was recommended to see a dermatologist regularly.
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
